# Patient Record
Sex: FEMALE | Race: BLACK OR AFRICAN AMERICAN | URBAN - METROPOLITAN AREA
[De-identification: names, ages, dates, MRNs, and addresses within clinical notes are randomized per-mention and may not be internally consistent; named-entity substitution may affect disease eponyms.]

---

## 2022-07-11 ENCOUNTER — WEB ENCOUNTER (OUTPATIENT)
Dept: URBAN - METROPOLITAN AREA CLINIC 52 | Facility: CLINIC | Age: 62
End: 2022-07-11

## 2022-07-11 ENCOUNTER — DASHBOARD ENCOUNTERS (OUTPATIENT)
Age: 62
End: 2022-07-11

## 2022-07-11 ENCOUNTER — OFFICE VISIT (OUTPATIENT)
Dept: URBAN - METROPOLITAN AREA CLINIC 52 | Facility: CLINIC | Age: 62
End: 2022-07-11
Payer: COMMERCIAL

## 2022-07-11 VITALS
BODY MASS INDEX: 23.91 KG/M2 | TEMPERATURE: 97.3 F | DIASTOLIC BLOOD PRESSURE: 80 MMHG | OXYGEN SATURATION: 100 % | HEART RATE: 66 BPM | HEIGHT: 59 IN | SYSTOLIC BLOOD PRESSURE: 154 MMHG | WEIGHT: 118.6 LBS

## 2022-07-11 DIAGNOSIS — Z12.11 SCREEN FOR COLON CANCER: ICD-10-CM

## 2022-07-11 DIAGNOSIS — L29.0 PRURITUS ANI: ICD-10-CM

## 2022-07-11 PROCEDURE — 99203 OFFICE O/P NEW LOW 30 MIN: CPT | Performed by: INTERNAL MEDICINE

## 2022-07-11 RX ORDER — FLUCONAZOLE 150 MG/1
TAKE 1 TABLET BY MOUTH ONCE TABLET ORAL
Qty: 1 EACH | Refills: 0 | Status: ACTIVE | COMMUNITY

## 2022-07-11 RX ORDER — BETAMETHASONE VALERATE 1.2 MG/G
CREAM TOPICAL
Qty: 45 GRAM | Status: ON HOLD | COMMUNITY

## 2022-07-11 RX ORDER — CLOBETASOL PROPIONATE 0.5 MG/G
APPLY TO AFFECTED AREA TWICE A DAY FOR 14 DAYS CREAM TOPICAL
Qty: 15 GRAM | Refills: 0 | Status: ON HOLD | COMMUNITY

## 2022-07-11 RX ORDER — DOXYCYCLINE HYCLATE 100 MG/1
TAKE 1 TABLET BY MOUTH EVERY 12 HOURS FOR 7 DAYS TABLET, COATED ORAL
Qty: 14 EACH | Refills: 0 | Status: ON HOLD | COMMUNITY

## 2022-07-11 RX ORDER — CICLOPIROX OLAMINE 7.7 MG/G
CREAM TOPICAL
Qty: 30 GRAM | Status: ACTIVE | COMMUNITY

## 2022-07-11 RX ORDER — DESONIDE 0.5 MG/G
CREAM TOPICAL
Qty: 60 GRAM | Status: ON HOLD | COMMUNITY

## 2022-07-11 RX ORDER — NAFTIFINE HYDROCHLORIDE 10 MG/G
CREAM TOPICAL
Qty: 60 GRAM | Status: ON HOLD | COMMUNITY

## 2022-07-11 RX ORDER — FLUOCINOLONE ACETONIDE 0.11 MG/118.28ML
OIL TOPICAL
Qty: 118.28 MILLILITER | Status: ACTIVE | COMMUNITY

## 2022-07-11 NOTE — HPI-TODAY'S VISIT:
Hemorrhoids itching + burning. RXed with multiple creams via dermatology Was given prednisone for same Regular BMs Denies constipation. No diarrhea or blood or abd pain. Takes Colon Cleanse or MOM [id number] X benign polyp.